# Patient Record
Sex: FEMALE | Race: WHITE | NOT HISPANIC OR LATINO | Employment: OTHER | URBAN - NONMETROPOLITAN AREA
[De-identification: names, ages, dates, MRNs, and addresses within clinical notes are randomized per-mention and may not be internally consistent; named-entity substitution may affect disease eponyms.]

---

## 2017-03-02 ENCOUNTER — OFFICE VISIT (OUTPATIENT)
Dept: OPHTHALMOLOGY | Facility: CLINIC | Age: 82
End: 2017-03-02

## 2017-03-02 DIAGNOSIS — H35.3190 NONEXUDATIVE AGE-RELATED MACULAR DEGENERATION: Primary | ICD-10-CM

## 2017-03-02 DIAGNOSIS — Z96.1 PSEUDOPHAKIA: ICD-10-CM

## 2017-03-02 PROCEDURE — 92014 COMPRE OPH EXAM EST PT 1/>: CPT | Performed by: OPHTHALMOLOGY

## 2017-03-02 RX ORDER — LISINOPRIL 20 MG/1
20 TABLET ORAL DAILY
COMMUNITY
Start: 2017-02-01

## 2017-03-02 RX ORDER — I-VITE, TAB 1000-60-2MG (60/BT) 300MCG-200
TAB ORAL
COMMUNITY
End: 2019-07-24

## 2017-03-02 RX ORDER — ATORVASTATIN CALCIUM 10 MG/1
TABLET, FILM COATED ORAL
COMMUNITY
Start: 2017-02-01

## 2017-03-02 NOTE — PROGRESS NOTES
Subjective   Teresa King is a 81 y.o. female.   Chief Complaint   Patient presents with   • Eye Exam   • Macular Degeneration   • Pseudophakia     HPI     No change in vision or Amsler grid       Last edited by Khari Marques MD on 3/2/2017 10:45 AM.       Review of Systems    Objective   Visual Acuity (Snellen - Linear)      Right Left   Dist cc 20/30 +2 20/40 +2   Near cc J2 J2       Correction:  Glasses         Wearing Rx      Sphere Cylinder Axis Add   Right -0.75  180 +2.75   Left -0.50 +0.50 097 +2.75                 Pupils      Pupils   Right PERRL   Left PERRL            Not recorded         Extraocular Movement      Right Left   Result Full Full              Tonometry (Applanation, 10:46 AM)      Right Left   Pressure 17 17              Main Ophthalmology Exam     External Exam      Right Left    External Normal Normal      Slit Lamp Exam      Right Left    Lids/Lashes Normal Normal    Conjunctiva/Sclera White and quiet White and quiet    Cornea Clear Clear    Anterior Chamber Deep and quiet Deep and quiet    Iris Round and reactive Round and reactive    Lens Posterior chamber intraocular lens Posterior chamber intraocular lens    Vitreous Normal Normal      Fundus Exam      Right Left    Disc Normal Normal    Macula Early age related macular degeneration Early age related macular degeneration    Vessels Normal Normal    Periphery Normal Normal                Assessment/Plan   Diagnoses and all orders for this visit:    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

## 2018-09-07 ENCOUNTER — HOSPITAL ENCOUNTER (EMERGENCY)
Facility: HOSPITAL | Age: 83
Discharge: ANOTHER HOSPITAL | End: 2018-09-07
Attending: EMERGENCY MEDICINE | Admitting: EMERGENCY MEDICINE

## 2018-09-07 ENCOUNTER — APPOINTMENT (OUTPATIENT)
Dept: CT IMAGING | Facility: HOSPITAL | Age: 83
End: 2018-09-07

## 2018-09-07 ENCOUNTER — APPOINTMENT (OUTPATIENT)
Dept: GENERAL RADIOLOGY | Facility: HOSPITAL | Age: 83
End: 2018-09-07

## 2018-09-07 VITALS
HEIGHT: 63 IN | RESPIRATION RATE: 20 BRPM | DIASTOLIC BLOOD PRESSURE: 93 MMHG | HEART RATE: 88 BPM | WEIGHT: 222 LBS | TEMPERATURE: 97.9 F | BODY MASS INDEX: 39.34 KG/M2 | SYSTOLIC BLOOD PRESSURE: 193 MMHG | OXYGEN SATURATION: 96 %

## 2018-09-07 DIAGNOSIS — R55 SYNCOPE, UNSPECIFIED SYNCOPE TYPE: ICD-10-CM

## 2018-09-07 DIAGNOSIS — A41.9 SEPSIS, DUE TO UNSPECIFIED ORGANISM: ICD-10-CM

## 2018-09-07 DIAGNOSIS — G93.89 PNEUMOCEPHALUS: Primary | ICD-10-CM

## 2018-09-07 LAB
ALBUMIN SERPL-MCNC: 3.8 G/DL (ref 3.4–4.8)
ALBUMIN/GLOB SERPL: 1.1 G/DL (ref 1.1–1.8)
ALP SERPL-CCNC: 212 U/L (ref 38–126)
ALT SERPL W P-5'-P-CCNC: 75 U/L (ref 9–52)
ANION GAP SERPL CALCULATED.3IONS-SCNC: 11 MMOL/L (ref 5–15)
AST SERPL-CCNC: 55 U/L (ref 14–36)
BASOPHILS # BLD MANUAL: 0.32 10*3/MM3 (ref 0–0.2)
BASOPHILS NFR BLD AUTO: 1 % (ref 0–2)
BILIRUB SERPL-MCNC: 1.3 MG/DL (ref 0.2–1.3)
BUN BLD-MCNC: 13 MG/DL (ref 7–21)
BUN/CREAT SERPL: 18.6 (ref 7–25)
CALCIUM SPEC-SCNC: 9.3 MG/DL (ref 8.4–10.2)
CHLORIDE SERPL-SCNC: 95 MMOL/L (ref 95–110)
CK SERPL-CCNC: 602 U/L (ref 30–135)
CO2 SERPL-SCNC: 28 MMOL/L (ref 22–31)
CREAT BLD-MCNC: 0.7 MG/DL (ref 0.5–1)
D-LACTATE SERPL-SCNC: 3.4 MMOL/L (ref 0.5–2)
DEPRECATED RDW RBC AUTO: 41.2 FL (ref 36.4–46.3)
ERYTHROCYTE [DISTWIDTH] IN BLOOD BY AUTOMATED COUNT: 12.4 % (ref 11.5–14.5)
FLUAV AG NPH QL: NEGATIVE
FLUBV AG NPH QL IA: NEGATIVE
GFR SERPL CREATININE-BSD FRML MDRD: 80 ML/MIN/1.73 (ref 39–90)
GLOBULIN UR ELPH-MCNC: 3.4 GM/DL (ref 2.3–3.5)
GLUCOSE BLD-MCNC: 171 MG/DL (ref 60–100)
HCT VFR BLD AUTO: 40.2 % (ref 35–45)
HGB BLD-MCNC: 13.9 G/DL (ref 12–15.5)
HOLD SPECIMEN: NORMAL
LIPASE SERPL-CCNC: 12 U/L (ref 23–300)
LYMPHOCYTES # BLD MANUAL: 0.64 10*3/MM3 (ref 0.6–4.2)
LYMPHOCYTES NFR BLD MANUAL: 13 % (ref 0–12)
LYMPHOCYTES NFR BLD MANUAL: 2 % (ref 10–50)
MCH RBC QN AUTO: 31.2 PG (ref 26.5–34)
MCHC RBC AUTO-ENTMCNC: 34.6 G/DL (ref 31.4–36)
MCV RBC AUTO: 90.3 FL (ref 80–98)
METAMYELOCYTES NFR BLD MANUAL: 1 % (ref 0–0)
MONOCYTES # BLD AUTO: 4.15 10*3/MM3 (ref 0–0.9)
NEUTROPHILS # BLD AUTO: 26.53 10*3/MM3 (ref 2–8.6)
NEUTROPHILS NFR BLD MANUAL: 75 % (ref 37–80)
NEUTS BAND NFR BLD MANUAL: 8 % (ref 0–5)
NT-PROBNP SERPL-MCNC: 3940 PG/ML (ref 0–1800)
PLAT MORPH BLD: NORMAL
PLATELET # BLD AUTO: 262 10*3/MM3 (ref 150–450)
PMV BLD AUTO: 10.2 FL (ref 8–12)
POLYCHROMASIA BLD QL SMEAR: ABNORMAL
POTASSIUM BLD-SCNC: 2.7 MMOL/L (ref 3.5–5.1)
PROT SERPL-MCNC: 7.2 G/DL (ref 6.3–8.6)
RBC # BLD AUTO: 4.45 10*6/MM3 (ref 3.77–5.16)
SMUDGE CELLS IN BLOOD BY LIGHT MICROSCOPY: 3 /100 WBC
SODIUM BLD-SCNC: 134 MMOL/L (ref 137–145)
TROPONIN I SERPL-MCNC: 0.03 NG/ML
WBC MORPH BLD: NORMAL
WBC NRBC COR # BLD: 31.96 10*3/MM3 (ref 3.2–9.8)
WHOLE BLOOD HOLD SPECIMEN: NORMAL
WHOLE BLOOD HOLD SPECIMEN: NORMAL

## 2018-09-07 PROCEDURE — 96374 THER/PROPH/DIAG INJ IV PUSH: CPT

## 2018-09-07 PROCEDURE — 83690 ASSAY OF LIPASE: CPT | Performed by: EMERGENCY MEDICINE

## 2018-09-07 PROCEDURE — 71046 X-RAY EXAM CHEST 2 VIEWS: CPT

## 2018-09-07 PROCEDURE — 36415 COLL VENOUS BLD VENIPUNCTURE: CPT

## 2018-09-07 PROCEDURE — 25010000002 ONDANSETRON PER 1 MG: Performed by: EMERGENCY MEDICINE

## 2018-09-07 PROCEDURE — 25010000002 CEFTRIAXONE: Performed by: EMERGENCY MEDICINE

## 2018-09-07 PROCEDURE — 84484 ASSAY OF TROPONIN QUANT: CPT | Performed by: EMERGENCY MEDICINE

## 2018-09-07 PROCEDURE — 83605 ASSAY OF LACTIC ACID: CPT | Performed by: EMERGENCY MEDICINE

## 2018-09-07 PROCEDURE — 80053 COMPREHEN METABOLIC PANEL: CPT | Performed by: EMERGENCY MEDICINE

## 2018-09-07 PROCEDURE — 83880 ASSAY OF NATRIURETIC PEPTIDE: CPT | Performed by: EMERGENCY MEDICINE

## 2018-09-07 PROCEDURE — 82550 ASSAY OF CK (CPK): CPT | Performed by: EMERGENCY MEDICINE

## 2018-09-07 PROCEDURE — 85025 COMPLETE CBC W/AUTO DIFF WBC: CPT

## 2018-09-07 PROCEDURE — 87077 CULTURE AEROBIC IDENTIFY: CPT | Performed by: EMERGENCY MEDICINE

## 2018-09-07 PROCEDURE — 87185 SC STD ENZYME DETCJ PER NZM: CPT | Performed by: EMERGENCY MEDICINE

## 2018-09-07 PROCEDURE — 99285 EMERGENCY DEPT VISIT HI MDM: CPT

## 2018-09-07 PROCEDURE — 87040 BLOOD CULTURE FOR BACTERIA: CPT

## 2018-09-07 PROCEDURE — 96375 TX/PRO/DX INJ NEW DRUG ADDON: CPT

## 2018-09-07 PROCEDURE — 72125 CT NECK SPINE W/O DYE: CPT

## 2018-09-07 PROCEDURE — 87181 SC STD AGAR DILUTION PER AGT: CPT | Performed by: EMERGENCY MEDICINE

## 2018-09-07 PROCEDURE — 70450 CT HEAD/BRAIN W/O DYE: CPT

## 2018-09-07 PROCEDURE — 85007 BL SMEAR W/DIFF WBC COUNT: CPT | Performed by: EMERGENCY MEDICINE

## 2018-09-07 PROCEDURE — 93005 ELECTROCARDIOGRAM TRACING: CPT | Performed by: EMERGENCY MEDICINE

## 2018-09-07 PROCEDURE — 93010 ELECTROCARDIOGRAM REPORT: CPT | Performed by: INTERNAL MEDICINE

## 2018-09-07 PROCEDURE — 87804 INFLUENZA ASSAY W/OPTIC: CPT | Performed by: EMERGENCY MEDICINE

## 2018-09-07 PROCEDURE — 87150 DNA/RNA AMPLIFIED PROBE: CPT | Performed by: EMERGENCY MEDICINE

## 2018-09-07 RX ORDER — ONDANSETRON 2 MG/ML
4 INJECTION INTRAMUSCULAR; INTRAVENOUS ONCE
Status: COMPLETED | OUTPATIENT
Start: 2018-09-07 | End: 2018-09-07

## 2018-09-07 RX ORDER — SODIUM CHLORIDE 0.9 % (FLUSH) 0.9 %
10 SYRINGE (ML) INJECTION AS NEEDED
Status: DISCONTINUED | OUTPATIENT
Start: 2018-09-07 | End: 2018-09-07 | Stop reason: HOSPADM

## 2018-09-07 RX ADMIN — SODIUM CHLORIDE 1000 ML: 900 INJECTION, SOLUTION INTRAVENOUS at 11:57

## 2018-09-07 RX ADMIN — ONDANSETRON HYDROCHLORIDE 4 MG: 2 INJECTION, SOLUTION INTRAMUSCULAR; INTRAVENOUS at 11:58

## 2018-09-07 RX ADMIN — CEFTRIAXONE 2 G: 2 INJECTION, POWDER, FOR SOLUTION INTRAMUSCULAR; INTRAVENOUS at 13:20

## 2018-09-07 NOTE — ED PROVIDER NOTES
Subjective   83 year old female is brought in by EMS after she was found in the floor by her  unresponsive. She may have urinated on herself. No witnessed seizure. Last seen by  about 4 am and when he got up at 9am he found her. Unwitnessed fall. Patient does not remember what happened. Family reports patient has been sick for 1 week with nausea, vomiting, fever, cough and weakness. At arrival patient alert and interactive and knows family members. Denies chest pain, shortness of breath. Febrile and tachycardic on arrival. She does not take blood thinner.     Family history, surgical history, social history, current medications and allergies are reviewed with the patient and triage documentation and vitals are reviewed.          History provided by:  Patient, spouse and relative   used: No        Review of Systems   Constitutional: Positive for activity change, appetite change, fatigue and fever.   HENT: Negative for congestion, ear discharge, ear pain, sinus pain, sinus pressure and sore throat.    Eyes: Negative for photophobia, pain and visual disturbance.   Respiratory: Positive for cough. Negative for choking, chest tightness, shortness of breath and wheezing.    Cardiovascular: Negative for chest pain, palpitations and leg swelling.   Gastrointestinal: Positive for nausea and vomiting. Negative for abdominal distention, abdominal pain and constipation.   Endocrine: Negative.    Genitourinary: Negative for dysuria, frequency, hematuria and urgency.   Musculoskeletal: Negative for arthralgias, back pain and neck pain.   Skin: Negative for color change, rash and wound.   Allergic/Immunologic: Negative.    Neurological: Positive for weakness. Negative for headaches.   Hematological: Negative for adenopathy. Does not bruise/bleed easily.   Psychiatric/Behavioral: Negative.        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 Carcinoma of the left breast.   • CATARACT EXTRACTION  04/14/2015    Left eye.   • DILATATION AND CURETTAGE  03/04/1965    Cervical polypectomy. Dilatation and curettage of uterus.   • MASTECTOMY  04/24/1990    Left modified radical mastectomy.   • OTHER SURGICAL HISTORY  08/20/2015    OCT SCAN RETINA 71202 (Nonexudative age-related macular degeneration)     • OTHER SURGICAL HISTORY  03/31/2015    OPTICAL BIOMETRY 51594 (Nuclear senile cataract   • TUBAL ABDOMINAL LIGATION  09/12/1972    Tubal ligation and incidental appendectom   • VENOUS ACCESS DEVICE (PORT) INSERTION  05/10/1990    Subclavian Mediport placement.       History reviewed. No pertinent family history.    Social History     Social History   • Marital status:      Social History Main Topics   • Smoking status: Never Smoker   • Drug use: Unknown     Other Topics Concern   • Not on file           Objective   Physical Exam   Constitutional: She appears well-developed and well-nourished. No distress.   HENT:   Head: Normocephalic and atraumatic. Head is without raccoon's eyes, without Andrade's sign, without abrasion, without contusion and without laceration.   Right Ear: Tympanic membrane normal. No hemotympanum.   Left Ear: Tympanic membrane normal. No hemotympanum.   Nose: Nose normal. No nasal septal hematoma.   Mouth/Throat: Mucous membranes are dry.   Eyes: Pupils are equal, round, and reactive to light. Conjunctivae and EOM are normal.   Neck: Normal range of motion and full passive range of motion without pain. Neck supple. No spinous  process tenderness and no muscular tenderness present. Normal range of motion present. No Brudzinski's sign and no Kernig's sign noted.   Cardiovascular: Normal rate, regular rhythm and normal heart sounds.   No extrasystoles are present.   No murmur heard.  Pulmonary/Chest: Effort normal and breath sounds normal. No tachypnea. No respiratory distress. She has no decreased breath sounds. She has no wheezes.   Abdominal: Soft. Bowel sounds are normal. She exhibits no distension. There is no hepatosplenomegaly. There is no tenderness. There is no CVA tenderness.   Musculoskeletal: Normal range of motion.   Neurological: She is alert. She has normal strength. No cranial nerve deficit or sensory deficit. GCS eye subscore is 4. GCS verbal subscore is 5. GCS motor subscore is 6.   Skin: Skin is warm and dry. Capillary refill takes less than 2 seconds. She is not diaphoretic.   Psychiatric: She has a normal mood and affect.   Nursing note and vitals reviewed.      Procedures  None         ED Course      Labs Reviewed   COMPREHENSIVE METABOLIC PANEL - Abnormal; Notable for the following:        Result Value    Glucose 171 (*)     Sodium 134 (*)     Potassium 2.7 (*)     ALT (SGPT) 75 (*)     AST (SGOT) 55 (*)     Alkaline Phosphatase 212 (*)     All other components within normal limits    Narrative:     The MDRD GFR formula is only valid for adults with stable renal function between ages 18 and 70.   LACTIC ACID, PLASMA - Abnormal; Notable for the following:     Lactate 3.4 (*)     All other components within normal limits   CBC WITH AUTO DIFFERENTIAL - Abnormal; Notable for the following:     WBC 31.96 (*)     All other components within normal limits   BNP (IN-HOUSE) - Abnormal; Notable for the following:     proBNP 3,940.0 (*)     All other components within normal limits   LIPASE - Abnormal; Notable for the following:     Lipase 12 (*)     All other components within normal limits   CK - Abnormal; Notable for the  following:     Creatine Kinase 602 (*)     All other components within normal limits   MANUAL DIFFERENTIAL - Abnormal; Notable for the following:     Lymphocyte % 2.0 (*)     Monocyte % 13.0 (*)     Bands %  8.0 (*)     Metamyelocyte % 1.0 (*)     Neutrophils Absolute 26.53 (*)     Monocytes Absolute 4.15 (*)     Basophils Absolute 0.32 (*)     All other components within normal limits   BLOOD CULTURE - Normal   BLOOD CULTURE - Normal   INFLUENZA ANTIGEN, RAPID - Normal   TROPONIN (IN-HOUSE) - Normal   RAINBOW DRAW    Narrative:     The following orders were created for panel order [facility] Draw.  Procedure                               Abnormality         Status                     ---------                               -----------         ------                     Light Blue Top[[id number]]                                   Final result               Green Top (Gel)[[id number]]                                  Final result               Lavender Top[[id number]]                                     Final result               Gold Top - SST[[id number]]                                   Final result                 Please view results for these tests on the individual orders.   LACTIC ACID REFLEX TIMER   CBC AND DIFFERENTIAL    Narrative:     The following orders were created for panel order CBC & Differential.  Procedure                               Abnormality         Status                     ---------                               -----------         ------                     Manual Differential[[id number]]          Abnormal            Final result               Scan Slide[[id number]]                                                                  CBC Auto Differential[[id number]]        Abnormal            Final result                 Please view results for these tests on the individual orders.   LIGHT BLUE TOP   GREEN TOP   LAVENDER TOP   GOLD TOP - SST     Xr Chest 2 View    Result Date: 9/7/2018  Narrative:  PROCEDURE: Chest PA and lateral REASON FOR EXAM: Simple Sepsis triage protocol FINDINGS: Comparison study dated Vita 15, 2010.  Expiratory chest. . Cardiac and pulmonary vasculature are normal . Stable left upper lobe small calcified lung parenchymal granuloma consistent with old granulomatous disease. Left lung base small linear opacity. Lungs otherwise clear. Pleural spaces are normal . No acute osseous abnormality.     Impression: 1.  Evidence of old granulomatous disease. 2.  Left lung base small linear opacity most likely representing discoid atelectasis versus less likely early pneumonia. 3.  Otherwise unremarkable expiratory chest. Electronically signed by:  Kieran Bernal MD  9/7/2018 11:43 AM CDT Workstation: [id number]    Ct Head Without Contrast    Result Date: 9/7/2018  Narrative: Noncontrast CT examination of the brain. INDICATION: Alteration of consciousness. Syncope Technique: Axial 5 mm contiguous images with brain parenchymal and bone windows This exam was performed according to our departmental dose-optimization program, which includes automated exposure control, adjustment of the mA and/or kV according to patient size and/or use of iterative reconstruction technique. Prior relevant examination: CT brain Vita 15, 2010.. There is pneumocephalus. Air is noted skull base left middle cranial fossa and more superiorly in the periphery left frontal area. There are involutional, atrophic changes. Brain parenchyma appears otherwise within normal limits. Ventricles are within normal limits in size. No evidence of abnormal mass or calcification is seen. No evidence of acute hemorrhage is noted. No discrete skull fractures observed. Normal sinuses. There is opacification, fluid in left mastoid air cells.     Impression: CONCLUSION: Pneumocephalus. Air within the inferior aspect left middle cranial fossa temporal region and air noted left frontal region. Opacification, air-fluid left mastoid air cells. No basilar  skull fracture is identified. Sinuses are unremarkable. Involutional, atrophic changes. CT brain without contrast is otherwise unremarkable. These findings were discussed with Dr. Rick Rincon in the emergency department at 12:35 PM. Electronically signed by:  Maycol Murray MD  9/7/2018 12:38 PM CDT Workstation: [id number]    Ct Cervical Spine Without Contrast    Result Date: 9/7/2018  Narrative: CT cervical spine without contrast HISTORY: Fell Nonenhanced axial scans of the cervical spine were obtained. Sagittal and coronal reconstructions were performed. This exam was performed according to our departmental dose-optimization program, which includes automated exposure control, adjustment of the mA and/or kV according to patient size and/or use of iterative reconstruction technique. CT DLP: 405.90 Normal cervical lordosis. Vertebral height and alignment maintained. No fracture identified. Multilevel facet arthropathy bilaterally. Degenerative changes upper thoracic spine. Minimal pneumocephalus left temporal lobe. Partial opacification of the left mastoid air cells. 2.1 cm probable cyst arising from the upper pole left lobe of the thyroid. 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     Impression: CONCLUSION: No cervical fracture. Multilevel facet arthropathy bilaterally. Minimal pneumocephalus left temporal lobe. Partial opacification of the left mastoid air cells. 2.1 cm probable cyst arising from the upper pole left lobe of the thyroid. This could be confirmed with nonemergent thyroid ultrasound. [id number] Electronically signed by:  Teddy Meyer MD  9/7/2018 2:35 PM CDT Workstation: [id number]        EKG September 7 2018 at 1151 reveals normal sinus rhythm at 92 bpm without any evidence of ischemia.  There is no comparison EKG.          MDM  Number of Diagnoses or Management Options  Pneumocephalus:   Sepsis, due to unspecified organism (CMS/[facility]):   Syncope, unspecified syncope type:      Amount  and/or Complexity of Data Reviewed  Clinical lab tests: reviewed  Tests in the radiology section of CPT®: reviewed  Tests in the medicine section of CPT®: reviewed  Obtain history from someone other than the patient: yes  Discuss the patient with other providers: yes    Critical Care  Total time providing critical care: < 30 minutes    Patient Progress  Patient progress: stable    Patient with unknown cause of unwitnessed fall. Ill for 1 week. Vitals with fever and tachycardia on arrival. SIRS positive. Improved with fluid. Lactic 3.4. No septic shock or severe sepsis. Unknown source. CXR negative. Influenza negative. Hypokalemia. Elevated BNP and troponin. EKG NSR without ischemia. Total . Kidney function unremarkable. WBC 31k. Radiology calls with findings of pneumocephalus on head CT. No obvious skull fracture. Concern for underlying skull fracture as patient does not appear meningitic.  Fracture vs. Meningitis. Patient started on Vancomycin and Rocephin. [place] emergent transfer more pertinent than attempted LP. Discussed with ED at [facility] and Dr. Jennings agrees and accepts the patient. Patient transferred via [facility]. Afebrile and normal heart rate at time of transfer. AAOx3 throughout ED stay. No signs of meningitis. Did not obtain urine while in ED. Patient and family acknowledge understanding of treatment, plan and need for transfer and they are agreeable.     Final diagnoses:   Pneumocephalus   Syncope, unspecified syncope type   Sepsis, due to unspecified organism (CMS/[facility])            Rick Rincon, DO  09/08/18 0016

## 2018-09-07 NOTE — ED NOTES
transferring pt to [facility] er to  per transfer coor candy. I faxed her face sheet and called mala in radiology to send images. I spoke with perfecto dawson at [facility] and [facility] and they are launched. I gave lynette montelongo number to call report [phone] along with transfer packet

## 2018-09-08 LAB
BACTERIA BLD CULT: ABNORMAL
BACTERIA ID TEST ISLT QL CULT: ABNORMAL

## 2018-09-08 NOTE — ED NOTES
Jadyn ARGUETA at [facility] notified of pt's positive blood culture results. RN stated that she will inform pt's Neo Jennings RN  09/08/18 [id number]

## 2018-09-10 LAB
BACTERIA SPEC AEROBE CULT: ABNORMAL
GRAM STN SPEC: ABNORMAL
ISOLATED FROM: ABNORMAL

## 2018-11-19 ENCOUNTER — OFFICE VISIT (OUTPATIENT)
Dept: CARDIOLOGY | Facility: CLINIC | Age: 83
End: 2018-11-19

## 2018-11-19 ENCOUNTER — DOCUMENTATION (OUTPATIENT)
Dept: CARDIOLOGY | Facility: CLINIC | Age: 83
End: 2018-11-19

## 2018-11-19 VITALS
DIASTOLIC BLOOD PRESSURE: 70 MMHG | HEART RATE: 98 BPM | HEIGHT: 63 IN | BODY MASS INDEX: 34.2 KG/M2 | SYSTOLIC BLOOD PRESSURE: 120 MMHG | WEIGHT: 193 LBS

## 2018-11-19 DIAGNOSIS — R00.0 TACHYCARDIA: ICD-10-CM

## 2018-11-19 DIAGNOSIS — R00.2 PALPITATION: ICD-10-CM

## 2018-11-19 DIAGNOSIS — E78.5 HYPERLIPIDEMIA, UNSPECIFIED HYPERLIPIDEMIA TYPE: Primary | ICD-10-CM

## 2018-11-19 DIAGNOSIS — I10 ESSENTIAL HYPERTENSION: ICD-10-CM

## 2018-11-19 PROCEDURE — 99204 OFFICE O/P NEW MOD 45 MIN: CPT | Performed by: INTERNAL MEDICINE

## 2018-11-19 PROCEDURE — 93000 ELECTROCARDIOGRAM COMPLETE: CPT | Performed by: INTERNAL MEDICINE

## 2018-11-19 RX ORDER — LEVETIRACETAM 500 MG/1
500 TABLET ORAL 2 TIMES DAILY
COMMUNITY

## 2018-11-19 RX ORDER — LEVOTHYROXINE SODIUM 50 UG/1
50 CAPSULE ORAL DAILY
COMMUNITY

## 2018-11-19 NOTE — PROGRESS NOTES
Patient received 48 hour holter today.  She has a latex allergy.  I did not use tape on  Her.  I offered to see if we could do different testing.  Patient and daughter decided to proceed without tape and just stickers.  After placing stickers Kayy came in with some hypoallergenic stickers.  I offered to switch them out x 2.  Patient and daughter declined x 2.   Patient and daughter stated she did ok with monitor stickers in the hospital .   I told them if it caused any irritation at all to take them off and call me.  Patient and daughter understood.

## 2018-11-19 NOTE — PROGRESS NOTES
Teresa King  83 y.o. female    11/19/2018  1. Hyperlipidemia, unspecified hyperlipidemia type    2. Essential hypertension    3. Palpitation    4. Tachycardia        History of Present Illness:    83 years old patient to had prolonged hospital course and recovery eventually transferred to [facility] and diagnosed meningitis with background History of hypertension, hypertensive heart disease, hyperlipidemia and hypothyroidism who noted to have palpitation with exertional activity during rehabilitation.  She walks with the help of the cane.  EKG in the office in sinus rhythm at 98 bpm with prolonged HI interval and leftward axis.  QTc interval is overestimated no syncope or near syncopal episode or chest pain reported.  No orthopnea PND reported.  Her shortness of breath probably secondary to physical deconditioning.        SUBJECTIVE:    Allergies   Allergen Reactions   • Adhesive Tape      BLISTERS         Past Medical History:   Diagnosis Date   • Abnormal ECG    • After cataract not obscuring vision    • Artificial lens present     Artificial lens in position      • Hyperlipidemia    • Hypertension    • Nonexudative age-related macular degeneration     Nonexudative age-related macular degeneration - moderate      • Nuclear senile cataract of left eye          Past Surgical History:   Procedure Laterality Date   • BREAST BIOPSY  04/12/1990    Left breast biopsy. Carcinoma of the left breast.   • CATARACT EXTRACTION  04/14/2015    Left eye.   • DILATATION AND CURETTAGE  03/04/1965    Cervical polypectomy. Dilatation and curettage of uterus.   • MASTECTOMY  04/24/1990    Left modified radical mastectomy.   • OTHER SURGICAL HISTORY  08/20/2015    OCT SCAN RETINA 14112 (Nonexudative age-related macular degeneration)     • OTHER SURGICAL HISTORY  03/31/2015    OPTICAL BIOMETRY 49192 (Nuclear senile cataract   • TUBAL ABDOMINAL LIGATION  09/12/1972    Tubal ligation and incidental appendectom   • VENOUS  ACCESS DEVICE (PORT) INSERTION  05/10/1990    Subclavian Mediport placement.         No family history on file.      Social History     Socioeconomic History   • Marital status:      Spouse name: Not on file   • Number of children: Not on file   • Years of education: Not on file   • Highest education level: Not on file   Social Needs   • Financial resource strain: Not on file   • Food insecurity - worry: Not on file   • Food insecurity - inability: Not on file   • Transportation needs - medical: Not on file   • Transportation needs - non-medical: Not on file   Occupational History   • Not on file   Tobacco Use   • Smoking status: Never Smoker   Substance and Sexual Activity   • Alcohol use: Not on file   • Drug use: Not on file   • Sexual activity: Not on file   Other Topics Concern   • Not on file   Social History Narrative   • Not on file         Current Outpatient Medications   Medication Sig Dispense Refill   • Alendronate Sodium (FOSAMAX PO) Take  by mouth.     • amLODIPine (NORVASC) 10 MG tablet Take 10 mg by mouth Daily.     • atorvastatin (LIPITOR) 10 MG tablet      • CALCIUM-ERGOCALCIFEROL PO Take  by mouth.     • hydrochlorothiazide (HYDRODIURIL) 25 MG tablet Take 25 mg by mouth Daily.     • levETIRAcetam (KEPPRA) 500 MG tablet Take 500 mg by mouth 2 (Two) Times a Day.     • levothyroxine (SYNTHROID, LEVOTHROID) 25 MCG tablet Take 25 mcg by mouth Daily.     • lisinopril (PRINIVIL,ZESTRIL) 20 MG tablet      • LISINOPRIL PO Take  by mouth.     • Multiple Vitamins-Minerals (I-OSWALD) tablet tablet Take  by mouth.     • Multiple Vitamins-Minerals (PRESERVISION/LUTEIN PO) Take  by mouth.       No current facility-administered medications for this visit.            Review of Systems:     Constitutional:  Denies recent weight loss, weight gain, fever or chills, no change in exercise tolerance.     HENT:  Denies any hearing loss, epistaxis, hoarseness, or difficulty speaking.     Eyes: No blurring  ".    Respiratory:  Exertional dyspnea probably secondary to physical deconditioning    Cardiovascular: See H&P    Gastrointestinal:  Denies change in bowel habits, dyspepsia, ulcer disease, hematochezia, or melena.     Endocrine: Negative for cold intolerance, heat intolerance, polydipsia, polyphagia and polyuria. Denies any history of weight change, polydipsia, polyuria.     Genitourinary: Negative.      Musculoskeletal:  arthritis    Skin:  Denies any change in hair or nails, rashes, or skin lesions.     Allergic/Immunologic: Negative.  Negative for environmental allergies, food allergies and immunocompromised state.     Neurological: History of meningitis    Hematological: Denies any food allergies, seasonal allergies, bleeding disorders, or lymphadenopathy.     Psychiatric/Behavioral: Denies any history of depression, substance abuse, or change in cognitive function.       OBJECTIVE:    /70   Pulse 98   Ht 160 cm (63\")   Wt 87.5 kg (193 lb)   BMI 34.19 kg/m²       Physical Exam:     Constitutional: Cooperative, alert and oriented, well-developed, well-nourished, in no acute distress.     HENT:   Head: Normocephalic, normal hair patterns, no masses or tenderness.  Ears, Nose, and Throat: No gross abnormalities. No pallor or cyanosis. Dentition good.   Eyes: EOMS intact, PERRL, conjunctivae and lids unremarkable. Fundoscopic exam and visual fields not performed.   Neck: No palpable masses or adenopathy, no thyromegaly, no JVD, carotid pulses are full and equal bilaterally and without  Bruits.     Cardiovascular: Regular rhythm, S1 and S2 normal, no S3 or S4. Apical impulse not displaced. No murmurs, gallops, or rubs detected.     Pulmonary/Chest: Chest: normal symmetry, no tenderness to palpation, normal respiratory excursion, no intercostal retraction, no use of accessory muscles. Pulmonary: Normal breath sounds. No rales or rhonchi.    Abdominal: Abdomen soft, bowel sounds normoactive, no masses, no " hepatosplenomegaly, non-tender, no bruits.     Musculoskeletal: No deformities, clubbing, cyanosis, erythema, or edema observed. There are no spinal abnormalities noted. Normal muscle strength and tone. Pulses full and equal in all extremities, no bruits auscultated.     Neurological: No gross motor or sensory deficits noted, affect appropriate, oriented to time, person, place.     Skin: Warm and dry to the touch, no apparent skin lesions or masses noted.     Psychiatric: She has a normal mood and affect. Her behavior is normal. Judgment and thought content normal.           ECG 12 Lead  Date/Time: 11/19/2018 10:01 AM  Performed by: Kang Abbasi MD  Authorized by: Kang Abbasi MD   Comparison: not compared with previous ECG   Rhythm: sinus rhythm  Comments: Sinus rhythm with a prolonged AK interval and leftward axis and overestimated QTc interval              Lab Results   Component Value Date    WBC 31.96 (H) 09/07/2018    HGB 13.9 09/07/2018    HCT 40.2 09/07/2018    MCV 90.3 09/07/2018     09/07/2018     Lab Results   Component Value Date    GLUCOSE 171 (H) 09/07/2018    BUN 13 09/07/2018    CREATININE 0.70 09/07/2018    EGFRIFNONA 80 09/07/2018    BCR 18.6 09/07/2018    CO2 28.0 09/07/2018    CALCIUM 9.3 09/07/2018    ALBUMIN 3.80 09/07/2018    AST 55 (H) 09/07/2018    ALT 75 (H) 09/07/2018     No results found for: CHOL  No results found for: TRIG  No results found for: HDL  No components found for: LDLCALC  No results found for: LDL  No results found for: HDLLDLRATIO  No components found for: CHOLHDL  No results found for: HGBA1C  No results found for: TSH, Z0NOIGU, M2BRUWK, THYROIDAB        ASSESSMENT AND PLAN:  #1 palpitation #2 sinus tachycardia with heart rate go up to 110 bpm during rehabilitation #3 hypertension with hypertensive heart disease #4 hyperlipidemia number for hypothyroidism #6 history of meningitis with a prolonged hospitalization and recovery    Clinically, no sign of cardiac  decompensation based on the clinical history physical finding.  EKG done and finding discussed the patient.  Resting heart rate in 90s with a prolonged [place] interval and leftward axis.  No acute ST-T wave changes noted.  Patient had a recent echocardiographic study done at [facility] reported normal left and a systolic function.  Her tachycardia with heart rate up to 1 10 bpm probably secondary to physical deconditioning noted during rehabilitation.  I will arrange 24-48 hour Holter to rule out any significant arrhythmia.  Currently patient being managed with lisinopril, amlodipine and hydrochlorothiazide for management of hypertension hypertensive heart disease, Synthroid for hypothyroidism and atorvastatin for hyperlipidemia.  Risk factor lifestyle modification discussed.    Teresa was seen today for new patient.    Diagnoses and all orders for this visit:    Hyperlipidemia, unspecified hyperlipidemia type    Essential hypertension  -     Holter Monitor - 48 Hour; Future  -     TSH; Future    Palpitation  -     Holter Monitor - 48 Hour; Future  -     TSH; Future    Tachycardia  -     Holter Monitor - 48 Hour; Future  -     TSH; Future        Kang Abbasi MD  11/19/2018  9:56 AM

## 2019-01-23 ENCOUNTER — OFFICE VISIT (OUTPATIENT)
Dept: CARDIOLOGY | Facility: CLINIC | Age: 84
End: 2019-01-23

## 2019-01-23 VITALS
OXYGEN SATURATION: 99 % | SYSTOLIC BLOOD PRESSURE: 128 MMHG | HEART RATE: 84 BPM | BODY MASS INDEX: 34.02 KG/M2 | HEIGHT: 63 IN | WEIGHT: 192 LBS | DIASTOLIC BLOOD PRESSURE: 74 MMHG

## 2019-01-23 DIAGNOSIS — E78.5 HYPERLIPIDEMIA, UNSPECIFIED HYPERLIPIDEMIA TYPE: ICD-10-CM

## 2019-01-23 DIAGNOSIS — R00.0 TACHYCARDIA: ICD-10-CM

## 2019-01-23 DIAGNOSIS — R00.2 PALPITATION: ICD-10-CM

## 2019-01-23 DIAGNOSIS — I10 ESSENTIAL HYPERTENSION: Primary | ICD-10-CM

## 2019-01-23 PROCEDURE — 99213 OFFICE O/P EST LOW 20 MIN: CPT | Performed by: INTERNAL MEDICINE

## 2019-01-23 NOTE — PROGRESS NOTES
Teresa King  83 y.o. female    01/23/2019  1. Essential hypertension    2. Hyperlipidemia, unspecified hyperlipidemia type    3. Palpitation    4. Tachycardia        History of Present Illness:    Patient's Body mass index is 34.01 kg/m². BMI is above normal parameters. Recommendations include: exercise counseling, nutrition counseling and referral to primary care   .  83 years old patient to had prolonged hospital course and recovery eventually transferred to [facility] and diagnosed meningitis with background History of hypertension, hypertensive heart disease, hyperlipidemia and hypothyroidism who noted to have palpitation with exertional activity during rehabilitation.  She walks with the help of the cane.  EKG in the office in sinus rhythm at 98 bpm with prolonged [place] interval and leftward axis.  QTc interval is overestimated no syncope or near syncopal episode or chest pain reported.  No orthopnea PND reported.  Her shortness of breath probably secondary to physical deconditioning    HOLTER 11/12/18  Description rhythm is sinus with average heart rate 77 bpm, minimum 37 between 12 and 12:30 AM with intermittent Mobitz type I second-degree AV block  and brief episode of second-degree AV block preceded by sinus bradycardia probably reflecting high vagal tone predominant at night .  There are frequent premature supraventricular ectopic beat rectum and 14,000 and representing 70% of total atrial activity and a rare premature ventricular complex and representing less than 1% of total ventricular activity with 3 beat idioventricular rhythm at 90 bpm.  No significant bradyarrhythmia noted during the daytime.  He reported symptom of fatigue and tiredness have no symptom rhythm correlation is a patient have corresponding normal sinus rhythm    SUBJECTIVE:    Allergies   Allergen Reactions   • Adhesive Tape      BLISTERS         Past Medical History:   Diagnosis Date   • Abnormal ECG    • After cataract  not obscuring vision    • Artificial lens present     Artificial lens in position      • Hyperlipidemia    • Hypertension    • Nonexudative age-related macular degeneration     Nonexudative age-related macular degeneration - moderate      • Nuclear senile cataract of left eye          Past Surgical History:   Procedure Laterality Date   • BREAST BIOPSY  04/12/1990    Left breast biopsy. Carcinoma of the left breast.   • CATARACT EXTRACTION  04/14/2015    Left eye.   • DILATATION AND CURETTAGE  03/04/1965    Cervical polypectomy. Dilatation and curettage of uterus.   • MASTECTOMY  04/24/1990    Left modified radical mastectomy.   • OTHER SURGICAL HISTORY  08/20/2015    OCT SCAN RETINA 69654 (Nonexudative age-related macular degeneration)     • OTHER SURGICAL HISTORY  03/31/2015    OPTICAL BIOMETRY 30019 (Nuclear senile cataract   • TUBAL ABDOMINAL LIGATION  09/12/1972    Tubal ligation and incidental appendectom   • VENOUS ACCESS DEVICE (PORT) INSERTION  05/10/1990    Subclavian Mediport placement.         No family history on file.      Social History     Socioeconomic History   • Marital status:      Spouse name: Not on file   • Number of children: Not on file   • Years of education: Not on file   • Highest education level: Not on file   Social Needs   • Financial resource strain: Not on file   • Food insecurity - worry: Not on file   • Food insecurity - inability: Not on file   • Transportation needs - medical: Not on file   • Transportation needs - non-medical: Not on file   Occupational History   • Not on file   Tobacco Use   • Smoking status: Never Smoker   Substance and Sexual Activity   • Alcohol use: Not on file   • Drug use: Not on file   • Sexual activity: Not on file   Other Topics Concern   • Not on file   Social History Narrative   • Not on file         Current Outpatient Medications   Medication Sig Dispense Refill   • Alendronate Sodium (FOSAMAX PO) Take  by mouth.     • amLODIPine (NORVASC) 10  MG tablet Take 10 mg by mouth Daily.     • atorvastatin (LIPITOR) 10 MG tablet      • CALCIUM-ERGOCALCIFEROL PO Take  by mouth.     • hydrochlorothiazide (HYDRODIURIL) 25 MG tablet Take 25 mg by mouth Daily.     • levETIRAcetam (KEPPRA) 500 MG tablet Take 500 mg by mouth 2 (Two) Times a Day.     • levothyroxine (SYNTHROID, LEVOTHROID) 25 MCG tablet Take 25 mcg by mouth Daily.     • lisinopril (PRINIVIL,ZESTRIL) 20 MG tablet      • Multiple Vitamins-Minerals (I-OSWALD) tablet tablet Take  by mouth.     • Multiple Vitamins-Minerals (PRESERVISION/LUTEIN PO) Take  by mouth.       No current facility-administered medications for this visit.            Review of Systems:     Constitutional:  Denies recent weight loss, weight gain, fever or chills, no change in exercise tolerance.     HENT:  Denies any hearing loss, epistaxis, hoarseness, or difficulty speaking.     Eyes: Wears eyeglasses or contact lenses.    Respiratory:  Denies dyspnea with exertion,no cough, wheezing, or hemoptysis.     Cardiovascular: Negative for palpations, chest pain, orthopnea, PND, peripheral edema, syncope, or claudication.     Gastrointestinal:  Denies change in bowel habits, dyspepsia, ulcer disease, hematochezia, or melena.     Endocrine: Negative for cold intolerance, heat intolerance, polydipsia, polyphagia and polyuria. Denies any history of weight change, polydipsia, polyuria.     Genitourinary: Negative.      Musculoskeletal: Denies any history of arthritic symptoms or back problems.     Skin:  Denies any change in hair or nails, rashes, or skin lesions.     Allergic/Immunologic: Negative.  Negative for environmental allergies, food allergies and immunocompromised state.     Neurological:  Denies any history of recurrent headaches, strokes, TIA, or seizure disorder.     Hematological: Denies any food allergies, seasonal allergies, bleeding disorders, or lymphadenopathy.     Psychiatric/Behavioral: Denies any history of depression,  "substance abuse, or change in cognitive function.       OBJECTIVE:    /74   Pulse 84   Ht 160 cm (63\")   Wt 87.1 kg (192 lb)   SpO2 99%   BMI 34.01 kg/m²       Physical Exam:     Constitutional: Cooperative, alert and oriented, well-developed, well-nourished, in no acute distress.     HENT:   Head: Normocephalic, normal hair patterns, no masses or tenderness.  Ears, Nose, and Throat: No gross abnormalities. No pallor or cyanosis. Dentition good.   Eyes: EOMS intact, PERRL, conjunctivae and lids unremarkable. Fundoscopic exam and visual fields not performed.   Neck: No palpable masses or adenopathy, no thyromegaly, no JVD, carotid pulses are full and equal bilaterally and without  Bruits.     Cardiovascular: Regular rhythm, S1 and S2 normal, no S3 or S4. Apical impulse not displaced. No murmurs, gallops, or rubs detected.     Pulmonary/Chest: Chest: normal symmetry, no tenderness to palpation, normal respiratory excursion, no intercostal retraction, no use of accessory muscles. Pulmonary: Normal breath sounds. No rales or rhonchi.    Abdominal: Abdomen soft, bowel sounds normoactive, no masses, no hepatosplenomegaly, non-tender, no bruits.     Musculoskeletal: No deformities, clubbing, cyanosis, erythema, or edema observed. There are no spinal abnormalities noted. Normal muscle strength and tone. Pulses full and equal in all extremities, no bruits auscultated.     Neurological: No gross motor or sensory deficits noted, affect appropriate, oriented to time, person, place.     Skin: Warm and dry to the touch, no apparent skin lesions or masses noted.     Psychiatric: She has a normal mood and affect. Her behavior is normal. Judgment and thought content normal.         Procedures      Lab Results   Component Value Date    WBC 31.96 (H) 09/07/2018    HGB 13.9 09/07/2018    HCT 40.2 09/07/2018    MCV 90.3 09/07/2018     09/07/2018     Lab Results   Component Value Date    GLUCOSE 171 (H) 09/07/2018    " BUN 13 09/07/2018    CREATININE 0.70 09/07/2018    EGFRIFNONA 80 09/07/2018    BCR 18.6 09/07/2018    CO2 28.0 09/07/2018    CALCIUM 9.3 09/07/2018    ALBUMIN 3.80 09/07/2018    AST 55 (H) 09/07/2018    ALT 75 (H) 09/07/2018     No results found for: CHOL  No results found for: TRIG  No results found for: HDL  No components found for: LDLCALC  No results found for: LDL  No results found for: HDLLDLRATIO  No components found for: CHOLHDL  No results found for: HGBA1C  No results found for: TSH, H1ZOXSD, Y3TMLLP, THYROIDAB        ASSESSMENT AND PLAN:  #1 palpitation #2 sinus tachycardia with heart rate go up to 110 bpm during rehabilitation #3 hypertension with hypertensive heart disease #4 hyperlipidemia number for hypothyroidism #6 history of meningitis with a prolonged hospitalization and recovery     Clinically, no sign of cardiac decompensation based on the clinical history physical finding.  EKG done and finding discussed the patient.  Resting heart rate in 90s with a prolonged HI interval and leftward axis.  No acute ST-T wave changes noted.  Patient had a recent echocardiographic study done at [facility] reported normal left and a systolic function.  Her tachycardia with heart rate up to 1 10 bpm probably secondary to physical deconditioning noted during rehabilitation.  .  Holter monitor finding discussed with the patient and the family.  No significant bradyarrhythmia during the daytime noted.  Patient have evidence of high vagal tone causing bradyarrhythmia during the nighttime on 24 Holter monitor and there is no indication for pacemaker implantation.    Currently patient being managed with lisinopril, amlodipine and hydrochlorothiazide for management of hypertension hypertensive heart disease, Synthroid for hypothyroidism and atorvastatin for hyperlipidemia.  Risk factor lifestyle modification discussed        Teresa was seen today for follow-up.    Diagnoses and all orders for this visit:    Essential  hypertension    Hyperlipidemia, unspecified hyperlipidemia type    Palpitation    Tachycardia        Kang Abbasi MD  1/23/2019  9:48 AM

## 2019-07-24 ENCOUNTER — OFFICE VISIT (OUTPATIENT)
Dept: CARDIOLOGY | Facility: CLINIC | Age: 84
End: 2019-07-24

## 2019-07-24 VITALS
HEIGHT: 63 IN | DIASTOLIC BLOOD PRESSURE: 86 MMHG | BODY MASS INDEX: 36.38 KG/M2 | SYSTOLIC BLOOD PRESSURE: 146 MMHG | WEIGHT: 205.3 LBS | OXYGEN SATURATION: 97 % | HEART RATE: 73 BPM

## 2019-07-24 DIAGNOSIS — E78.5 HYPERLIPIDEMIA, UNSPECIFIED HYPERLIPIDEMIA TYPE: Primary | ICD-10-CM

## 2019-07-24 DIAGNOSIS — R00.0 TACHYCARDIA: ICD-10-CM

## 2019-07-24 DIAGNOSIS — R00.2 PALPITATION: ICD-10-CM

## 2019-07-24 DIAGNOSIS — I10 ESSENTIAL HYPERTENSION: ICD-10-CM

## 2019-07-24 PROCEDURE — 99213 OFFICE O/P EST LOW 20 MIN: CPT | Performed by: INTERNAL MEDICINE

## 2019-07-24 RX ORDER — POLYETHYLENE GLYCOL 1450
1 POWDER (GRAM) MISCELLANEOUS DAILY
COMMUNITY

## 2019-07-24 NOTE — PROGRESS NOTES
Teresa King  84 y.o. female    07/24/2019  1. Hyperlipidemia, unspecified hyperlipidemia type    2. Essential hypertension    3. Palpitation    4. Tachycardia        History of Present Illness:    Body mass index is 36.37 kg/m². BMI is above normal parameters. Recommendations include: exercise counseling, nutrition counseling and referral to primary care.        84 years old patient had a history of prolonged hospital course with good recovery and diagnosis of  meningitis with background History of hypertension, hypertensive heart disease, hyperlipidemia and hypothyroidism who noted to have palpitation with exertional activity during rehabilitation.  She walks with the help of the cane.  Previous EKG in the office in sinus rhythm at 98 bpm with prolonged CT interval and leftward axis.  QTc interval is overestimated no syncope or near syncopal episode or chest pain reported.  No orthopnea PND reported.  Her shortness of breath probably secondary to physical deconditioning     HOLTER 11/12/18  Description rhythm is sinus with average heart rate 77 bpm, minimum 37 between 12 and 12:30 AM with intermittent Mobitz type I second-degree AV block  and brief episode of second-degree AV block preceded by sinus bradycardia probably reflecting high vagal tone predominant at night .  There are frequent premature supraventricular ectopic beat rectum and 14,000 and representing 70% of total atrial activity and a rare premature ventricular complex and representing less than 1% of total ventricular activity with 3 beat idioventricular rhythm at 90 bpm.  No significant bradyarrhythmia noted during the daytime.  He reported symptom of fatigue and tiredness have no symptom rhythm correlation is a patient have corresponding normal sinus rhythm    ECHO 9/2018  SUMMARY OF FINDINGS:  1.  Normal M-mode measurements.  2.  Preserved normal LVEF.  Calculated ejection fraction of *55*%.  3.  Aortic valve is sclerotic.  Mitral valve is  thickened/age-related  changes.  4.  Ascending aorta is normal in size measured at 2.7.  5.  RV systolic pressure is estimated at 28 mmHg.  6.  Anterior echo-free space, probably epicardial fat.  7.  Lipomatous thickening of the interatrial septum noted.  8.  Grade I diastolic dysfunction.  9.  Trace pericardial effusion considered to be within normal  variance and no evidence of tamponade physiology.  10. The aortic valve area is normal range measured at 2.31 cm2.  11. Normal LV function and size at the upper limits of normal.        SUBJECTIVE:    Allergies   Allergen Reactions   • Adhesive Tape      BLISTERS         Past Medical History:   Diagnosis Date   • Abnormal ECG    • After cataract not obscuring vision    • Artificial lens present     Artificial lens in position      • Hyperlipidemia    • Hypertension    • Nonexudative age-related macular degeneration     Nonexudative age-related macular degeneration - moderate      • Nuclear senile cataract of left eye          Past Surgical History:   Procedure Laterality Date   • BREAST BIOPSY  04/12/1990    Left breast biopsy. Carcinoma of the left breast.   • CATARACT EXTRACTION  04/14/2015    Left eye.   • DILATATION AND CURETTAGE  03/04/1965    Cervical polypectomy. Dilatation and curettage of uterus.   • MASTECTOMY  04/24/1990    Left modified radical mastectomy.   • OTHER SURGICAL HISTORY  08/20/2015    OCT SCAN RETINA 84860 (Nonexudative age-related macular degeneration)     • OTHER SURGICAL HISTORY  03/31/2015    OPTICAL BIOMETRY 68355 (Nuclear senile cataract   • TUBAL ABDOMINAL LIGATION  09/12/1972    Tubal ligation and incidental appendectom   • VENOUS ACCESS DEVICE (PORT) INSERTION  05/10/1990    Subclavian Mediport placement.         No family history on file.      Social History     Socioeconomic History   • Marital status:      Spouse name: Not on file   • Number of children: Not on file   • Years of education: Not on file   • Highest education  level: Not on file   Tobacco Use   • Smoking status: Never Smoker         Current Outpatient Medications   Medication Sig Dispense Refill   • Alendronate Sodium (FOSAMAX PO) Take  by mouth.     • amLODIPine (NORVASC) 10 MG tablet Take 10 mg by mouth Daily.     • atorvastatin (LIPITOR) 10 MG tablet      • CALCIUM-ERGOCALCIFEROL PO Take  by mouth.     • hydrochlorothiazide (HYDRODIURIL) 25 MG tablet Take 25 mg by mouth Daily.     • levETIRAcetam (KEPPRA) 500 MG tablet Take 500 mg by mouth 2 (Two) Times a Day.     • levothyroxine (SYNTHROID, LEVOTHROID) 25 MCG tablet Take 25 mcg by mouth Daily.     • lisinopril (PRINIVIL,ZESTRIL) 20 MG tablet 20 mg Daily.     • Multiple Vitamins-Minerals (PRESERVISION/LUTEIN PO) Take  by mouth.     • Multiple Vitamins-Minerals (I-OSWALD) tablet tablet Take  by mouth.     • Polyethylene Glycol powder Take 1 packet by mouth Daily.       No current facility-administered medications for this visit.            Review of Systems:     Constitutional:  Denies recent weight loss, weight gain, fever or chills, no change in exercise tolerance.     HENT:  Denies any hearing loss, epistaxis, hoarseness, or difficulty speaking.     Eyes: Wears eyeglasses or contact lenses.    Respiratory:  Denies dyspnea with exertion,no cough, wheezing, or hemoptysis.     Cardiovascular: Negative for palpations, chest pain, orthopnea, PND, peripheral edema, syncope, or claudication.     Gastrointestinal:  Denies change in bowel habits, dyspepsia, ulcer disease, hematochezia, or melena.     Endocrine: Negative for cold intolerance, heat intolerance, polydipsia, polyphagia and polyuria. Denies any history of weight change, polydipsia, polyuria.     Genitourinary: Negative.      Musculoskeletal: Denies any history of arthritic symptoms or back problems.     Skin:  Denies any change in hair or nails, rashes, or skin lesions.     Allergic/Immunologic: Negative.  Negative for environmental allergies, food allergies and  "immunocompromised state.     Neurological:  Denies any history of recurrent headaches, strokes, TIA, or seizure disorder.     Hematological: Denies any food allergies, seasonal allergies, bleeding disorders, or lymphadenopathy.     Psychiatric/Behavioral: Denies any history of depression, substance abuse, or change in cognitive function.       OBJECTIVE:    /86 (BP Location: Right arm, Patient Position: Sitting, Cuff Size: Adult)   Pulse 73   Ht 160 cm (63\")   Wt 93.1 kg (205 lb 4.8 oz)   SpO2 97%   BMI 36.37 kg/m²       Physical Exam:     Constitutional: Cooperative, alert and oriented, well-developed, well-nourished, in no acute distress.     HENT:   Head: Normocephalic, normal hair patterns, no masses or tenderness.  Ears, Nose, and Throat: No gross abnormalities. No pallor or cyanosis. Dentition good.   Eyes: EOMS intact, PERRL, conjunctivae and lids unremarkable. Fundoscopic exam and visual fields not performed.   Neck: No palpable masses or adenopathy, no thyromegaly, no JVD, carotid pulses are full and equal bilaterally and without  Bruits.     Cardiovascular: Regular rhythm, S1 and S2 normal, no S3 or S4. Apical impulse not displaced. No murmurs, gallops, or rubs detected.     Pulmonary/Chest: Chest: normal symmetry, no tenderness to palpation, normal respiratory excursion, no intercostal retraction, no use of accessory muscles. Pulmonary: Normal breath sounds. No rales or rhonchi.    Abdominal: Abdomen soft, bowel sounds normoactive, no masses, no hepatosplenomegaly, non-tender, no bruits.     Musculoskeletal: No deformities, clubbing, cyanosis, erythema, or edema observed. There are no spinal abnormalities noted. Normal muscle strength and tone. Pulses full and equal in all extremities, no bruits auscultated.     Neurological: No gross motor or sensory deficits noted, affect appropriate, oriented to time, person, place.     Skin: Warm and dry to the touch, no apparent skin lesions or masses " noted.     Psychiatric: She has a normal mood and affect. Her behavior is normal. Judgment and thought content normal.         Procedures      Lab Results   Component Value Date    WBC 31.96 (H) 09/07/2018    HGB 13.9 09/07/2018    HCT 40.2 09/07/2018    MCV 90.3 09/07/2018     09/07/2018     Lab Results   Component Value Date    GLUCOSE 171 (H) 09/07/2018    BUN 12 09/07/2018    CREATININE 0.6 09/08/2018    EGFRIFNONA 80 09/07/2018    BCR 18.6 09/07/2018    CO2 26 09/07/2018    CALCIUM 9.3 09/07/2018    ALBUMIN 3.5 09/07/2018    LABIL2 1.1 09/07/2018    AST 42 (H) 09/07/2018    ALT 51 (H) 09/07/2018     Lab Results   Component Value Date    CHOL 156 07/23/2018     Lab Results   Component Value Date    TRIG 103 07/23/2018     Lab Results   Component Value Date    HDL 51 07/23/2018     No components found for: LDLCALC  Lab Results   Component Value Date    LDL 80 07/23/2018     No results found for: HDLLDLRATIO  No components found for: CHOLHDL  No results found for: HGBA1C  Lab Results   Component Value Date    TSH 3.05 10/15/2018           ASSESSMENT AND PLAN:  #1 palpitation #2 sinus tachycardia with heart rate go up to 110 bpm during rehabilitation #3 hypertension with hypertensive heart disease #4 hyperlipidemia number for hypothyroidism #6 history of meningitis with a prolonged hospitalization and recovery     Clinically, no sign of cardiac decompensation based on the clinical history physical finding.  Previous EKG sinus rhythm with a leftward axis and  No acute ST-T wave changes noted.  Previous echo at [facility] reported normal left and systolic function and sinus tachycardia likely secondary to physical deconditioning noted during rehabilitation.  .  The previous Holter monitor finding discussed with the patient and the family.  No significant bradyarrhythmia during the daytime noted.  Patient have evidence of high vagal tone causing bradyarrhythmia during the nighttime on 24 Holter monitor  and there is no indication for pacemaker implantation.    Currently patient being managed with lisinopril, amlodipine and hydrochlorothiazide for management of hypertension hypertensive heart disease, Synthroid for hypothyroidism and atorvastatin for hyperlipidemia.  Risk factor lifestyle modification discussed        Teresa was seen today for rapid heart rate and hypertension.    Diagnoses and all orders for this visit:    Hyperlipidemia, unspecified hyperlipidemia type    Essential hypertension    Palpitation    Tachycardia        Kang Abbasi MD  7/24/2019  11:20 AM

## 2020-01-28 DIAGNOSIS — R00.0 TACHYCARDIA: Primary | ICD-10-CM

## 2020-01-29 ENCOUNTER — OFFICE VISIT (OUTPATIENT)
Dept: CARDIOLOGY | Facility: CLINIC | Age: 85
End: 2020-01-29

## 2020-01-29 VITALS
DIASTOLIC BLOOD PRESSURE: 90 MMHG | HEART RATE: 83 BPM | OXYGEN SATURATION: 98 % | HEIGHT: 63 IN | WEIGHT: 222 LBS | SYSTOLIC BLOOD PRESSURE: 136 MMHG | BODY MASS INDEX: 39.34 KG/M2

## 2020-01-29 DIAGNOSIS — R00.2 PALPITATION: ICD-10-CM

## 2020-01-29 DIAGNOSIS — E78.5 HYPERLIPIDEMIA, UNSPECIFIED HYPERLIPIDEMIA TYPE: ICD-10-CM

## 2020-01-29 DIAGNOSIS — I10 ESSENTIAL HYPERTENSION: ICD-10-CM

## 2020-01-29 DIAGNOSIS — R00.0 TACHYCARDIA: Primary | ICD-10-CM

## 2020-01-29 PROCEDURE — 99213 OFFICE O/P EST LOW 20 MIN: CPT | Performed by: INTERNAL MEDICINE

## 2020-01-29 NOTE — PROGRESS NOTES
Teresa King  84 y.o. female    1/29/2020     1. Tachycardia    2. Palpitation    3. Essential hypertension    4. Hyperlipidemia, unspecified hyperlipidemia type        History of Present Illness:    Body mass index is 39.33 kg/m². BMI is above normal parameters. Recommendations include: exercise counseling, nutrition counseling and referral to primary care.        84 years old patient presented for routine follow-up with no symptom of orthopnea, PND, palpitation, dizziness with a background history of  prolonged hospital course with good recovery and diagnosis of  meningitis , hypertension, hypertensive heart disease, hyperlipidemia and hypothyroidism who noted to have palpitation with exertional activity during rehabilitation.  She walks with the help of the cane.  Previous EKG in the office in sinus rhythm at 98 bpm with prolonged TN interval and leftward axis.  QTc interval is overestimated no syncope or near syncopal episode or chest pain reported.  No orthopnea PND reported.  Her shortness of breath probably secondary to physical deconditioning     HOLTER 11/12/18  Description rhythm is sinus with average heart rate 77 bpm, minimum 37 between 12 and 12:30 AM with intermittent Mobitz type I second-degree AV block  and brief episode of second-degree AV block preceded by sinus bradycardia probably reflecting high vagal tone predominant at night .  There are frequent premature supraventricular ectopic beat ,14,000 and representing 70% of total atrial activity and a rare premature ventricular complex and representing less than 1% of total ventricular activity with 3 beat idioventricular rhythm at 90 bpm.  No significant bradyarrhythmia noted during the daytime.  He reported symptom of fatigue and tiredness have no symptom rhythm correlation is a patient have corresponding normal sinus rhythm    ECHO 9/2018  SUMMARY OF FINDINGS:  1.  Normal M-mode measurements.  2.  Preserved normal LVEF.  Calculated ejection  fraction of *55*%.  3.  Aortic valve is sclerotic.  Mitral valve is thickened/age-related  changes.  4.  Ascending aorta is normal in size measured at 2.7.  5.  RV systolic pressure is estimated at 28 mmHg.  6.  Anterior echo-free space, probably epicardial fat.  7.  Lipomatous thickening of the interatrial septum noted.  8.  Grade I diastolic dysfunction.  9.  Trace pericardial effusion considered to be within normal  variance and no evidence of tamponade physiology.  10. The aortic valve area is normal range measured at 2.31 cm2.  11. Normal LV function and size at the upper limits of normal.        SUBJECTIVE:    Allergies   Allergen Reactions   • Adhesive Tape Other (See Comments)     BLISTERS         Past Medical History:   Diagnosis Date   • Abnormal ECG    • After cataract not obscuring vision    • Artificial lens present     Artificial lens in position      • Hyperlipidemia    • Hypertension    • Nonexudative age-related macular degeneration     Nonexudative age-related macular degeneration - moderate      • Nuclear senile cataract of left eye          Past Surgical History:   Procedure Laterality Date   • BREAST BIOPSY  04/12/1990    Left breast biopsy. Carcinoma of the left breast.   • CATARACT EXTRACTION  04/14/2015    Left eye.   • DILATATION AND CURETTAGE  03/04/1965    Cervical polypectomy. Dilatation and curettage of uterus.   • MASTECTOMY  04/24/1990    Left modified radical mastectomy.   • OTHER SURGICAL HISTORY  08/20/2015    OCT SCAN RETINA 39523 (Nonexudative age-related macular degeneration)     • OTHER SURGICAL HISTORY  03/31/2015    OPTICAL BIOMETRY 29431 (Nuclear senile cataract   • TUBAL ABDOMINAL LIGATION  09/12/1972    Tubal ligation and incidental appendectom   • VENOUS ACCESS DEVICE (PORT) INSERTION  05/10/1990    Subclavian Mediport placement.         History reviewed. No pertinent family history.      Social History     Socioeconomic History   • Marital status:      Spouse name:  Not on file   • Number of children: Not on file   • Years of education: Not on file   • Highest education level: Not on file   Tobacco Use   • Smoking status: Never Smoker         Current Outpatient Medications   Medication Sig Dispense Refill   • Alendronate Sodium (FOSAMAX PO) Take  by mouth.     • amLODIPine (NORVASC) 10 MG tablet Take 10 mg by mouth Daily.     • atorvastatin (LIPITOR) 10 MG tablet      • CALCIUM-ERGOCALCIFEROL PO Take  by mouth.     • hydrochlorothiazide (HYDRODIURIL) 25 MG tablet Take 25 mg by mouth Daily.     • levETIRAcetam (KEPPRA) 500 MG tablet Take 500 mg by mouth 2 (Two) Times a Day.     • levothyroxine sodium (TIROSINT) 50 MCG capsule Take 50 mcg by mouth Daily.     • lisinopril (PRINIVIL,ZESTRIL) 20 MG tablet 20 mg Daily.     • Multiple Vitamins-Minerals (PRESERVISION/LUTEIN PO) Take  by mouth.     • Polyethylene Glycol powder Take 1 packet by mouth Daily.       No current facility-administered medications for this visit.            Review of Systems:     Constitutional:  Denies recent weight loss, weight gain, fever or chills, no change in exercise tolerance.     HENT:  Denies any hearing loss, epistaxis, hoarseness, or difficulty speaking.     Eyes: Wears eyeglasses or contact lenses.    Respiratory:  Denies dyspnea with exertion,no cough, wheezing, or hemoptysis.     Cardiovascular: See H&P  Gastrointestinal:  Denies change in bowel habits, dyspepsia, ulcer disease, hematochezia, or melena.     Endocrine: Negative for cold intolerance, heat intolerance, polydipsia, polyphagia and polyuria. Denies any history of weight change, polydipsia, polyuria.     Genitourinary: Negative.      Musculoskeletal: Denies any history of arthritic symptoms or back problems.     Skin:  Denies any change in hair or nails, rashes, or skin lesions.     Allergic/Immunologic: Negative.  Negative for environmental allergies, food allergies and immunocompromised state.     Neurological: History of  "meningitis  Hematological: Denies any food allergies, seasonal allergies, bleeding disorders, or lymphadenopathy.     Psychiatric/Behavioral: Denies any history of depression, substance abuse, or change in cognitive function.       OBJECTIVE:    /90   Pulse 83   Ht 160 cm (63\")   Wt 101 kg (222 lb)   SpO2 98%   BMI 39.33 kg/m²       Physical Exam:     Constitutional: Cooperative, alert and oriented, well-developed, well-nourished, in no acute distress.     HENT:   Head: Normocephalic, normal hair patterns, no masses or tenderness.  Ears, Nose, and Throat: No gross abnormalities. No pallor or cyanosis. Dentition good.   Eyes: EOMS intact, PERRL, conjunctivae and lids unremarkable. Fundoscopic exam and visual fields not performed.   Neck: No palpable masses or adenopathy, no thyromegaly, no JVD, carotid pulses are full and equal bilaterally and without  Bruits.     Cardiovascular: Regular rhythm, S1 and S2 normal, no S3 or S4. Apical impulse not displaced. No murmurs, gallops, or rubs detected.     Pulmonary/Chest: Chest: normal symmetry, no tenderness to palpation, normal respiratory excursion, no intercostal retraction, no use of accessory muscles. Pulmonary: Normal breath sounds. No rales or rhonchi.    Abdominal: Abdomen soft, bowel sounds normoactive, no masses, no hepatosplenomegaly, non-tender, no bruits.     Musculoskeletal: No deformities, clubbing, cyanosis, erythema, or edema observed. There are no spinal abnormalities noted. Normal muscle strength and tone. Pulses full and equal in all extremities, no bruits auscultated.     Neurological: No gross motor or sensory deficits noted, affect appropriate, oriented to time, person, place.     Skin: Warm and dry to the touch, no apparent skin lesions or masses noted.     Psychiatric: She has a normal mood and affect. Her behavior is normal. Judgment and thought content normal.         Procedures      Lab Results   Component Value Date    WBC 5.9 " 10/04/2019    HGB 15.2 (H) 10/04/2019    HCT 46.4 (H) 10/04/2019    MCV 95 10/04/2019     10/04/2019     Lab Results   Component Value Date    GLUCOSE 171 (H) 09/07/2018    BUN 15 08/22/2019    CREATININE 1 08/22/2019    EGFRIFNONA 80 09/07/2018    BCR 18.6 09/07/2018    CO2 29 09/19/2018    CALCIUM 11.1 (H) 08/22/2019    ALBUMIN 4.4 08/22/2019    LABIL2 0.8 09/12/2018    AST 15 08/22/2019    ALT 63 (H) 08/22/2019     Lab Results   Component Value Date    CHOL 172 08/22/2019    CHOL 156 07/23/2018     Lab Results   Component Value Date    TRIG 120 08/22/2019    TRIG 103 07/23/2018     Lab Results   Component Value Date    HDL 60 08/22/2019    HDL 51 07/23/2018     No components found for: LDLCALC  Lab Results   Component Value Date    LDL 88 08/22/2019    LDL 80 07/23/2018     No results found for: HDLLDLRATIO  No components found for: CHOLHDL  Lab Results   Component Value Date    HGBA1C 5.4 10/04/2019     Lab Results   Component Value Date    TSH 3.05 10/15/2018           ASSESSMENT AND PLAN:  #1 palpitation   #2 sinus tachycardia with heart rate go up to 110 bpm during rehab  #3 hypertension with hypertensive heart disease   #4 hyperlipidemia number for hypothyroidism   #6 history of meningitis with a prolonged hospitalization and recovery     Clinically, no sign of cardiac decompensation based on the clinical history physical finding.  Previous EKG sinus rhythm with a leftward axis and  No acute ST-T wave changes noted.  Previous echo at [facility] reported normal left  systolic function and sinus tachycardia likely secondary to physical deconditioning noted during rehabilitation.  .  Previous Holter, No significant bradyarrhythmia during the daytime noted.  Patient have evidence of high vagal tone causing bradyarrhythmia during the nighttime on 24 Holter monitor and there is no indication for pacemaker implantation.    Currently patient being managed with lisinopril, amlodipine and  hydrochlorothiazide for management of hypertension hypertensive heart disease, Synthroid for hypothyroidism and atorvastatin for hyperlipidemia.  Risk factor lifestyle modification discussed significantly low carbohydrate, low-fat, DASH diet and graded exercise discussed with the patient        Teresa was seen today for follow-up.    Diagnoses and all orders for this visit:    Tachycardia    Palpitation    Essential hypertension    Hyperlipidemia, unspecified hyperlipidemia type        Kang Abbasi MD  1/29/2020  11:09 AM

## 2021-03-17 ENCOUNTER — IMMUNIZATION (OUTPATIENT)
Dept: VACCINE CLINIC | Facility: HOSPITAL | Age: 86
End: 2021-03-17

## 2021-03-17 PROCEDURE — 0001A: CPT | Performed by: THORACIC SURGERY (CARDIOTHORACIC VASCULAR SURGERY)

## 2021-03-17 PROCEDURE — 91300 HC SARSCOV02 VAC 30MCG/0.3ML IM: CPT | Performed by: THORACIC SURGERY (CARDIOTHORACIC VASCULAR SURGERY)

## 2021-04-07 ENCOUNTER — IMMUNIZATION (OUTPATIENT)
Dept: VACCINE CLINIC | Facility: HOSPITAL | Age: 86
End: 2021-04-07

## 2021-04-07 PROCEDURE — 91300 HC SARSCOV02 VAC 30MCG/0.3ML IM: CPT | Performed by: THORACIC SURGERY (CARDIOTHORACIC VASCULAR SURGERY)

## 2021-04-07 PROCEDURE — 0002A: CPT | Performed by: THORACIC SURGERY (CARDIOTHORACIC VASCULAR SURGERY)
